# Patient Record
Sex: MALE | Race: BLACK OR AFRICAN AMERICAN | URBAN - METROPOLITAN AREA
[De-identification: names, ages, dates, MRNs, and addresses within clinical notes are randomized per-mention and may not be internally consistent; named-entity substitution may affect disease eponyms.]

---

## 2024-09-23 ENCOUNTER — OFFICE VISIT (OUTPATIENT)
Dept: URBAN - METROPOLITAN AREA CLINIC 113 | Facility: CLINIC | Age: 24
End: 2024-09-23
Payer: COMMERCIAL

## 2024-09-23 ENCOUNTER — LAB OUTSIDE AN ENCOUNTER (OUTPATIENT)
Dept: URBAN - METROPOLITAN AREA CLINIC 113 | Facility: CLINIC | Age: 24
End: 2024-09-23

## 2024-09-23 ENCOUNTER — DASHBOARD ENCOUNTERS (OUTPATIENT)
Age: 24
End: 2024-09-23

## 2024-09-23 VITALS
HEART RATE: 89 BPM | SYSTOLIC BLOOD PRESSURE: 110 MMHG | TEMPERATURE: 98.4 F | BODY MASS INDEX: 32.86 KG/M2 | RESPIRATION RATE: 20 BRPM | WEIGHT: 216.8 LBS | HEIGHT: 68 IN | DIASTOLIC BLOOD PRESSURE: 87 MMHG

## 2024-09-23 DIAGNOSIS — R63.4 WEIGHT LOSS, UNINTENTIONAL: ICD-10-CM

## 2024-09-23 DIAGNOSIS — R68.81 EARLY SATIETY: ICD-10-CM

## 2024-09-23 DIAGNOSIS — R11.2 NAUSEA AND VOMITING: ICD-10-CM

## 2024-09-23 DIAGNOSIS — K59.09 CHANGE IN BOWEL MOVEMENTS INTERMITTENT CONSTIPATION. URGENCY IN THE MORNING.: ICD-10-CM

## 2024-09-23 PROCEDURE — 99204 OFFICE O/P NEW MOD 45 MIN: CPT | Performed by: NURSE PRACTITIONER

## 2024-09-23 RX ORDER — TRAMADOL HYDROCHLORIDE 50 MG/1
1 TABLET AS NEEDED TABLET, FILM COATED ORAL
Status: ACTIVE | COMMUNITY

## 2024-09-23 RX ORDER — SUCRALFATE 1 G/1
1 TABLET ON AN EMPTY STOMACH TABLET ORAL
Qty: 120 TABLET | Refills: 3 | OUTPATIENT
Start: 2024-09-23 | End: 2025-01-20

## 2024-09-23 RX ORDER — HYOSCYAMINE SULFATE 0.12 MG/1
PLACE 1 TABLET UNDER THE TONGUE AND ALLOW TO DISSOLVE AS NEEDED FOR ABDOMINAL PAIN TABLET SUBLINGUAL
Qty: 45 | Refills: 1 | OUTPATIENT
Start: 2024-09-23 | End: 2024-10-13

## 2024-09-23 RX ORDER — PANTOPRAZOLE SODIUM 40 MG/1
1 TABLET TABLET, DELAYED RELEASE ORAL ONCE A DAY
Status: ACTIVE | COMMUNITY

## 2024-09-23 RX ORDER — DICYCLOMINE HYDROCHLORIDE 20 MG/1
1 TABLET TABLET ORAL THREE TIMES A DAY
Status: ACTIVE | COMMUNITY

## 2024-09-23 NOTE — HPI-TODAY'S VISIT:
24yo male presenting for evaluation of abdominal pain. His mother accompanies him and assists with the history. Since early August, he has experienced persistent mid to upper abdominal pain which is worsened with any oral intake.  He reports associated nausea with vomiting a few times per week.  He complains of early satiety, resulting in a several pound weight loss.  He denies any reflux symptoms or dysphagia.  He does complain of constipation, going up to 4 days without a bowel movement.  He recently required magnesium citrate.  He denies blood in the stool.  Recent ER workup available for review is outlined below.  Upon initial presentation to [facility] in early August, his liver enzymes were apparently markedly elevated.  A CT at that time showed no acute findings.  He has been on oral antibiotics, dicyclomine, and pantoprazole without relief to this point. RUQ ultrasound 9/17/2024:Gallbladder is partially fluid-filled with gallbladder wall top normal likely related to incomplete distention without sonographic evidence of cholelithiasis, cholecystitis or biliary ductal dilatation. Labs 9/16/2024:H/H14.6/42.8, MCV 84.1, , WBC 9.90.  AST 37, ALT 85, , T. bili 0.5, CMP otherwise unremarkable.  Lipase 39. 9/10/2024:Negative H. pylori breath test.  TSH 3.450.  Normal amylase and lipase. 8/15/2024:Negative hepatitis A antibody total, hepatitis B surface antigen, hepatitis B surface antibody, hepatitis B core antibody total, hepatitis C antibody.  GGT 28. 8/12/2024:, , , T. bili 0.3.

## 2024-09-29 ENCOUNTER — TELEPHONE ENCOUNTER (OUTPATIENT)
Dept: URBAN - METROPOLITAN AREA SURGERY CENTER 25 | Facility: SURGERY CENTER | Age: 24
End: 2024-09-29

## 2024-09-30 ENCOUNTER — OFFICE VISIT (OUTPATIENT)
Dept: URBAN - METROPOLITAN AREA SURGERY CENTER 25 | Facility: SURGERY CENTER | Age: 24
End: 2024-09-30

## 2024-10-02 ENCOUNTER — CLAIMS CREATED FROM THE CLAIM WINDOW (OUTPATIENT)
Dept: URBAN - METROPOLITAN AREA MEDICAL CENTER 19 | Facility: MEDICAL CENTER | Age: 24
End: 2024-10-02
Payer: COMMERCIAL

## 2024-10-02 ENCOUNTER — OFFICE VISIT (OUTPATIENT)
Dept: URBAN - METROPOLITAN AREA SURGERY CENTER 25 | Facility: SURGERY CENTER | Age: 24
End: 2024-10-02

## 2024-10-02 DIAGNOSIS — R93.3 ABN FINDINGS-GI TRACT: ICD-10-CM

## 2024-10-02 DIAGNOSIS — K59.09 OTHER CONSTIPATION: ICD-10-CM

## 2024-10-02 DIAGNOSIS — R10.13 EPIGASTRIC PAIN: ICD-10-CM

## 2024-10-02 DIAGNOSIS — R74.01 ELEVATION OF LEVELS OF LIVER TRANSAMINASE LEVELS: ICD-10-CM

## 2024-10-02 PROCEDURE — 99222 1ST HOSP IP/OBS MODERATE 55: CPT | Performed by: INTERNAL MEDICINE

## 2024-10-02 PROCEDURE — 99254 IP/OBS CNSLTJ NEW/EST MOD 60: CPT | Performed by: INTERNAL MEDICINE

## 2024-10-02 RX ORDER — HYOSCYAMINE SULFATE 0.12 MG/1
PLACE 1 TABLET UNDER THE TONGUE AND ALLOW TO DISSOLVE AS NEEDED FOR ABDOMINAL PAIN TABLET SUBLINGUAL
Qty: 45 | Refills: 1 | Status: ACTIVE | COMMUNITY
Start: 2024-09-23 | End: 2024-10-13

## 2024-10-02 RX ORDER — DICYCLOMINE HYDROCHLORIDE 20 MG/1
1 TABLET TABLET ORAL THREE TIMES A DAY
Status: ACTIVE | COMMUNITY

## 2024-10-02 RX ORDER — PANTOPRAZOLE SODIUM 40 MG/1
1 TABLET TABLET, DELAYED RELEASE ORAL ONCE A DAY
Status: ACTIVE | COMMUNITY

## 2024-10-02 RX ORDER — TRAMADOL HYDROCHLORIDE 50 MG/1
1 TABLET AS NEEDED TABLET, FILM COATED ORAL
Status: ACTIVE | COMMUNITY

## 2024-10-02 RX ORDER — SUCRALFATE 1 G/1
1 TABLET ON AN EMPTY STOMACH TABLET ORAL
Qty: 120 TABLET | Refills: 3 | Status: ACTIVE | COMMUNITY
Start: 2024-09-23 | End: 2025-01-20

## 2024-10-03 ENCOUNTER — CLAIMS CREATED FROM THE CLAIM WINDOW (OUTPATIENT)
Dept: URBAN - METROPOLITAN AREA MEDICAL CENTER 19 | Facility: MEDICAL CENTER | Age: 24
End: 2024-10-03
Payer: COMMERCIAL

## 2024-10-03 DIAGNOSIS — R63.4 ABNORMAL INTENTIONAL WEIGHT LOSS: ICD-10-CM

## 2024-10-03 DIAGNOSIS — R10.13 ABDOMINAL DISCOMFORT, EPIGASTRIC: ICD-10-CM

## 2024-10-03 DIAGNOSIS — R11.2 ACUTE NAUSEA WITH NONBILIOUS VOMITING: ICD-10-CM

## 2024-10-03 DIAGNOSIS — K31.89 OTHER DISEASES OF STOMACH AND DUODENUM: ICD-10-CM

## 2024-10-03 DIAGNOSIS — K22.2 ACQUIRED ESOPHAGEAL RING: ICD-10-CM

## 2024-10-03 PROCEDURE — 43239 EGD BIOPSY SINGLE/MULTIPLE: CPT | Performed by: INTERNAL MEDICINE

## 2024-10-04 ENCOUNTER — TELEPHONE ENCOUNTER (OUTPATIENT)
Dept: URBAN - METROPOLITAN AREA CLINIC 113 | Facility: CLINIC | Age: 24
End: 2024-10-04

## 2024-10-05 ENCOUNTER — CLAIMS CREATED FROM THE CLAIM WINDOW (OUTPATIENT)
Dept: URBAN - METROPOLITAN AREA MEDICAL CENTER 19 | Facility: MEDICAL CENTER | Age: 24
End: 2024-10-05
Payer: COMMERCIAL

## 2024-10-05 DIAGNOSIS — K59.09 OTHER CONSTIPATION: ICD-10-CM

## 2024-10-05 DIAGNOSIS — R74.01 ABNORMAL/ELEVATED TRANSAMINASE (SGOT, AMINOTRANSFERASE): ICD-10-CM

## 2024-10-05 DIAGNOSIS — R10.13 ABDOMINAL DISCOMFORT, EPIGASTRIC: ICD-10-CM

## 2024-10-05 PROCEDURE — 99232 SBSQ HOSP IP/OBS MODERATE 35: CPT | Performed by: INTERNAL MEDICINE

## 2024-10-07 ENCOUNTER — CLAIMS CREATED FROM THE CLAIM WINDOW (OUTPATIENT)
Dept: URBAN - METROPOLITAN AREA MEDICAL CENTER 19 | Facility: MEDICAL CENTER | Age: 24
End: 2024-10-07
Payer: COMMERCIAL

## 2024-10-07 DIAGNOSIS — I82.220: ICD-10-CM

## 2024-10-07 DIAGNOSIS — R74.01 ABNORMAL/ELEVATED TRANSAMINASE (SGOT, AMINOTRANSFERASE): ICD-10-CM

## 2024-10-07 DIAGNOSIS — K86.89 OTHER SPECIFIED DISEASES OF PANCREAS: ICD-10-CM

## 2024-10-07 PROCEDURE — 43259 EGD US EXAM DUODENUM/JEJUNUM: CPT | Performed by: INTERNAL MEDICINE

## 2024-10-08 ENCOUNTER — CLAIMS CREATED FROM THE CLAIM WINDOW (OUTPATIENT)
Dept: URBAN - METROPOLITAN AREA MEDICAL CENTER 19 | Facility: MEDICAL CENTER | Age: 24
End: 2024-10-08
Payer: COMMERCIAL

## 2024-10-08 DIAGNOSIS — R93.3 ABN FINDINGS-GI TRACT: ICD-10-CM

## 2024-10-08 DIAGNOSIS — R10.13 ABDOMINAL DISCOMFORT, EPIGASTRIC: ICD-10-CM

## 2024-10-08 PROCEDURE — 99232 SBSQ HOSP IP/OBS MODERATE 35: CPT | Performed by: INTERNAL MEDICINE

## 2024-10-21 ENCOUNTER — OFFICE VISIT (OUTPATIENT)
Dept: URBAN - METROPOLITAN AREA CLINIC 113 | Facility: CLINIC | Age: 24
End: 2024-10-21
Payer: COMMERCIAL

## 2024-10-21 VITALS
WEIGHT: 212.2 LBS | HEART RATE: 77 BPM | BODY MASS INDEX: 32.16 KG/M2 | TEMPERATURE: 98.1 F | RESPIRATION RATE: 20 BRPM | DIASTOLIC BLOOD PRESSURE: 66 MMHG | SYSTOLIC BLOOD PRESSURE: 102 MMHG | HEIGHT: 68 IN

## 2024-10-21 DIAGNOSIS — K44.9 HIATAL HERNIA: ICD-10-CM

## 2024-10-21 DIAGNOSIS — R10.13 EPIGASTRIC ABDOMINAL PAIN: ICD-10-CM

## 2024-10-21 DIAGNOSIS — R11.2 NAUSEA AND VOMITING: ICD-10-CM

## 2024-10-21 DIAGNOSIS — I81 PORTAL VEIN THROMBOSIS: ICD-10-CM

## 2024-10-21 DIAGNOSIS — R68.81 EARLY SATIETY: ICD-10-CM

## 2024-10-21 PROCEDURE — 99214 OFFICE O/P EST MOD 30 MIN: CPT | Performed by: NURSE PRACTITIONER

## 2024-10-21 RX ORDER — SUCRALFATE 1 G/1
1 TABLET ON AN EMPTY STOMACH TABLET ORAL
Qty: 120 TABLET | Refills: 3 | Status: ON HOLD | COMMUNITY
Start: 2024-09-23 | End: 2025-01-20

## 2024-10-21 RX ORDER — APIXABAN 5 MG/1
AS DIRECTED TABLET, FILM COATED ORAL
Status: ACTIVE | COMMUNITY

## 2024-10-21 RX ORDER — PANTOPRAZOLE SODIUM 40 MG/1
1 TABLET TABLET, DELAYED RELEASE ORAL ONCE A DAY
Status: ON HOLD | COMMUNITY

## 2024-10-21 RX ORDER — DICYCLOMINE HYDROCHLORIDE 20 MG/1
1 TABLET TABLET ORAL THREE TIMES A DAY
Status: ACTIVE | COMMUNITY

## 2024-10-21 RX ORDER — APIXABAN 5 MG/1
AS DIRECTED TABLET, FILM COATED ORAL
OUTPATIENT

## 2024-10-21 RX ORDER — TRAMADOL HYDROCHLORIDE 50 MG/1
1 TABLET AS NEEDED TABLET, FILM COATED ORAL
Status: ON HOLD | COMMUNITY

## 2024-10-21 NOTE — HPI-OTHER HISTORIES
RUQ ultrasound 9/17/2024:Gallbladder is partially fluid-filled with gallbladder wall top normal likely related to incomplete distention without sonographic evidence of cholelithiasis, cholecystitis or biliary ductal dilatation. Labs 9/16/2024:H/H14.6/42.8, MCV 84.1, , WBC 9.90. AST 37, ALT 85, , T. bili 0.5, CMP otherwise unremarkable. Lipase 39. 9/10/2024:Negative H. pylori breath test. TSH 3.450. Normal amylase and lipase. 8/15/2024:Negative hepatitis A antibody total, hepatitis B surface antigen, hepatitis B surface antibody, hepatitis B core antibody total, hepatitis C antibody. GGT 28. 8/12/2024:, , , T. bili 0.3.

## 2024-10-21 NOTE — HPI-TODAY'S VISIT:
22yo male with a history of DVT, PE, May-Thurner syndrome, migraines, presenting for folllow-up of abdominal pain. His mother accompanies him and assists with the history. He was seen in the office on 9/23 for evaluation of a recent exacerbation of postprandial upper abdominal pain, nausea with vomiting, and early satiety resulting in abnormal weight loss. He reported no benefit from an empiric course of antibiotics, dicyclomine, or pantoprazole. Recent RUQ ultrasound showed no acute process. His liver enzymes were reportedly markedly elevated at the time of initial ER evaluation; subsequent viral hepatitis screening was negative. Additional labs were planned to exclude secondary cause of advanced liver disease including autoimmune hepatitis or iron overload and a HIDA scan was recommended to evaluate for chronic cholecystitis. An upper endoscopy was planned to evaluate for peptic ulcer disease. He was to continue his regimen with daily pantoprazole, and Carafate and hyoscyamine were provided to use as needed. It was discussed that his constipation may be playing a role and he was encouraged a daily bowel regimen with MiraLAX. Unfortunately, the upper endoscopy was delayed d/t the Kera systems. He was ultimately seen in hospital consultation on 10/5 for epigastric pain and elevated liver enzymes. Extensive work-up as follows. EUS 10/7/2024 by Dr. 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  Normal gallbladder and extrahepatic bile duct.  No stones or sludge visualized.  EGD revealed a normal esophagus and a 3 cm hiatal hernia. HIDA scan 10/4/2024:No evidence of acute or chronic cholecystitis.  Normal gallbladder ejection fraction of 70%. EGD 10/3/2024 by Dr. 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  Gastric biopsies were negative for H. pylori and duodenal biopsies were negative for sprue. RUQ ultrasound 10/3/2024:Suggestion of trace gallbladder sludge.  No gallstones or sonographic evidence for acute cholecystitis. CT of the abdomen and pelvis with contrast 10/2/2024:Early acute uncomplicated appendicitis, with imaging showing a mildly thickened appendix with hyperenhancing with surrounding free pelvic fluid and mild inflammatory changes. He was discharged on Eliquis. Fortunately, his symptoms have improved. He continues to experience intermittent epigastric pain and nausea with meals, though less frequent and severe than prior. He has only had one episode of vomiting since he was in the hospital. His bowel habits are regular. He is awaiting outpatient follow-up with Dr. Rush jones.

## 2025-01-21 ENCOUNTER — OFFICE VISIT (OUTPATIENT)
Dept: URBAN - METROPOLITAN AREA CLINIC 113 | Facility: CLINIC | Age: 25
End: 2025-01-21